# Patient Record
Sex: FEMALE | Race: BLACK OR AFRICAN AMERICAN | NOT HISPANIC OR LATINO | Employment: FULL TIME | URBAN - METROPOLITAN AREA
[De-identification: names, ages, dates, MRNs, and addresses within clinical notes are randomized per-mention and may not be internally consistent; named-entity substitution may affect disease eponyms.]

---

## 2023-07-27 ENCOUNTER — HOSPITAL ENCOUNTER (OUTPATIENT)
Dept: DATA CONVERSION | Facility: HOSPITAL | Age: 23
End: 2023-07-27
Attending: ANESTHESIOLOGY | Admitting: ANESTHESIOLOGY
Payer: COMMERCIAL

## 2023-07-27 DIAGNOSIS — G57.93 UNSPECIFIED MONONEUROPATHY OF BILATERAL LOWER LIMBS: ICD-10-CM

## 2023-07-27 DIAGNOSIS — M54.50 LOW BACK PAIN, UNSPECIFIED: ICD-10-CM

## 2023-07-27 DIAGNOSIS — Z45.49 ENCOUNTER FOR ADJUSTMENT AND MANAGEMENT OF OTHER IMPLANTED NERVOUS SYSTEM DEVICE: ICD-10-CM

## 2023-08-01 LAB
COMPLETE PATHOLOGY REPORT: NORMAL
CONVERTED CLINICAL DIAGNOSIS-HISTORY: NORMAL
CONVERTED FINAL DIAGNOSIS: NORMAL
CONVERTED FINAL REPORT PDF LINK TO COPY AND PASTE: NORMAL
CONVERTED GROSS DESCRIPTION: NORMAL

## 2023-09-29 VITALS — HEIGHT: 65 IN | BODY MASS INDEX: 30.89 KG/M2 | WEIGHT: 185.41 LBS

## 2023-09-30 NOTE — H&P
"    History & Physical Reviewed:   Pregnant/Lactating:  ·  Are You Pregnant no (1)   ·  Are You Currently Breastfeeding no (1)     I have reviewed the History and Physical dated:  10-Jul-2023   History and Physical reviewed and relevant findings noted. Patient examined to review pertinent physical  findings.: No significant changes   Home Medications Reviewed: no changes noted   Allergies Reviewed: no changes noted       ERAS (Enhanced Recovery After Surgery):  ·  ERAS Patient: no     Consent:   COVID-19 Consent:  ·  COVID-19 Risk Consent Surgeon has reviewed key risks related to the risk of artemio COVID-19 and if they contract COVID-19 what the risks are.     Attestation:   Note Completion:  I am a:  Resident/Fellow   Attending Attestation I saw and evaluated the patient.  I personally obtained the key and critical portions of the history and physical exam or was physically present for key and  critical portions performed by the resident/fellow. I reviewed the resident/fellow?s documentation and discussed the patient with the resident/fellow.  I agree with the resident/fellow?s medical decision making as documented in the note.   I personally evaluated the patient on 27-Jul-2023         Electronic Signatures:  Marcia Reid)  (Signed 27-Jul-2023 12:55)   Co-Signer: History & Physical Reviewed, ERAS, Consent,  Note Completion  Sonny Stewart (Fellow))  (Signed 27-Jul-2023 06:54)   Authored: History & Physical Reviewed, ERAS, Consent,  Note Completion      Last Updated: 27-Jul-2023 12:55 by Marcia Reid (MD)    References:  1.  Data Referenced From \"Patient Profile - Preop v3\" 27-Jul-2023 06:18   "

## 2023-10-02 NOTE — OP NOTE
Post Operative Note:     PreOp Diagnosis: Chronic lower extremity neuropathic  pain, now resolved   Post-Procedure Diagnosis: Chronic lower extremity  neuropathic pain, now resolved   Procedure: 1.  Explant dual percutaneous spinal cord  stimulator leads  2.  Explant internal programmable generator of the spinal cord stimulator  3.  Physician guided fluoroscopy   Surgeon: Marcia Reid MD, PhD   Resident/Fellow/Other Assistant: Sonny Stewart MD   Estimated Blood Loss (mL): none   Specimen: no   Findings: See Operative Note     Operative Report Dictated:  Dictation: not applicable - note contains Operative  Report   Operative Report:    Ms. Odom is a 22-year-old lady who has had chronic bilateral lower extremity pain since the age of 6 years old and ultimately received a spinal cord stimulator  at the age of 14 which helped relieve her pain.  However, over the past 2 to 3 years, her pain has been status he resolved and she has not been using the spinal cord stimulator and desires it to be explanted.  She is presenting today for an explant of  her spinal cord stimulator system.    Following informed consent, the patient was brought to the operating room and placed in the prone position.  The low back area was prepped and draped in usual sterile fashion.  The skin and subcutaneous tissue overlying the right paraspinous incision  in the lumbar region was anesthetized with a total of 15 cc of a solution containing 1% lidocaine and 0.25% bupivacaine with 1: 400,000 epinephrine.  A #15 blade was used to incise the skin and subcutaneous tissue down to the capsule of the spinal cord  stimulator generator.  The generator was then freed of its anchoring sutures and removed from the pocket.  The 4-lead contacts getting into the generator were cut with a suture scissor.  The leads were then dissected down to the anchors and the anchors  freed of their anchoring sutures.  Both 16-contact leads were removed in their  entirety.  The wound was then copiously irrigated with a saline solution and closed in a layered fashion with 0 Vicryl for the deepest layer followed by 2-0 V-Loc and 4-0 V-Loc  for the subcutaneous and subcuticular layers respectively.  Topical antibiotic and island dressing was then applied and the patient was then transferred to the recovery area in a stable condition.    She was given appropriate discharge instructions as well as a prescription of 9 tablets of oxycodone, 5 mg, 1 p.o. 3 times daily as needed.  She will follow-up with us in 1 week in the clinic.    Attestation:   Note Completion:  Attending Attestation I was present for the entire procedure         Electronic Signatures:  Marcia Reid)  (Signed 27-Jul-2023 13:03)   Authored: Post Operative Note, Note Completion      Last Updated: 27-Jul-2023 13:03 by Marcia Ried)

## 2023-10-30 ENCOUNTER — HOSPITAL ENCOUNTER (EMERGENCY)
Facility: HOSPITAL | Age: 23
Discharge: LEFT BEFORE DISCHARGE | End: 2023-10-30
Payer: COMMERCIAL

## 2023-10-30 VITALS
TEMPERATURE: 98.4 F | DIASTOLIC BLOOD PRESSURE: 76 MMHG | HEIGHT: 65 IN | RESPIRATION RATE: 16 BRPM | WEIGHT: 180 LBS | OXYGEN SATURATION: 94 % | SYSTOLIC BLOOD PRESSURE: 121 MMHG | BODY MASS INDEX: 29.99 KG/M2 | HEART RATE: 81 BPM

## 2023-10-30 PROCEDURE — 99281 EMR DPT VST MAYX REQ PHY/QHP: CPT

## 2023-10-30 PROCEDURE — 4500999001 HC ED NO CHARGE

## 2023-10-30 ASSESSMENT — COLUMBIA-SUICIDE SEVERITY RATING SCALE - C-SSRS
6. HAVE YOU EVER DONE ANYTHING, STARTED TO DO ANYTHING, OR PREPARED TO DO ANYTHING TO END YOUR LIFE?: NO
2. HAVE YOU ACTUALLY HAD ANY THOUGHTS OF KILLING YOURSELF?: NO
1. IN THE PAST MONTH, HAVE YOU WISHED YOU WERE DEAD OR WISHED YOU COULD GO TO SLEEP AND NOT WAKE UP?: NO

## 2023-10-30 NOTE — ED TRIAGE NOTES
Pt c/o sore throat with painful swallowing starting this AM. Pt denies any SOB, fever, chills or recent sick contacts. Pt A&Ox4 upon arrival, appears in no acute distress.

## 2023-10-31 ENCOUNTER — HOSPITAL ENCOUNTER (EMERGENCY)
Facility: HOSPITAL | Age: 23
Discharge: HOME | End: 2023-10-31
Payer: COMMERCIAL

## 2023-10-31 VITALS
TEMPERATURE: 98.8 F | HEART RATE: 103 BPM | DIASTOLIC BLOOD PRESSURE: 77 MMHG | SYSTOLIC BLOOD PRESSURE: 126 MMHG | RESPIRATION RATE: 18 BRPM | OXYGEN SATURATION: 100 %

## 2023-10-31 DIAGNOSIS — J04.0 LARYNGITIS: ICD-10-CM

## 2023-10-31 DIAGNOSIS — J02.9 VIRAL PHARYNGITIS: Primary | ICD-10-CM

## 2023-10-31 PROBLEM — N63.10 BREAST MASS, RIGHT: Status: ACTIVE | Noted: 2023-10-31

## 2023-10-31 PROBLEM — Z96.89 SPINAL CORD STIMULATOR STATUS: Status: ACTIVE | Noted: 2023-10-31

## 2023-10-31 PROBLEM — E55.9 VITAMIN D DEFICIENCY: Status: ACTIVE | Noted: 2023-10-31

## 2023-10-31 PROBLEM — H52.13 MYOPIA, BILATERAL: Status: ACTIVE | Noted: 2023-10-31

## 2023-10-31 PROBLEM — G89.29 CHRONIC BILATERAL LOW BACK PAIN WITHOUT SCIATICA: Status: ACTIVE | Noted: 2023-10-31

## 2023-10-31 PROBLEM — M54.50 CHRONIC BILATERAL LOW BACK PAIN WITHOUT SCIATICA: Status: ACTIVE | Noted: 2023-10-31

## 2023-10-31 PROBLEM — N93.9 ABNORMAL UTERINE BLEEDING: Status: ACTIVE | Noted: 2023-10-31

## 2023-10-31 PROBLEM — G90.529 CRPS (COMPLEX REGIONAL PAIN SYNDROME), LOWER LIMB: Status: ACTIVE | Noted: 2023-10-31

## 2023-10-31 PROBLEM — H52.203 ASTIGMATISM OF BOTH EYES: Status: ACTIVE | Noted: 2023-10-31

## 2023-10-31 PROBLEM — G47.00 INSOMNIA: Status: ACTIVE | Noted: 2023-10-31

## 2023-10-31 PROBLEM — M79.7 FIBROMYALGIA: Status: ACTIVE | Noted: 2023-10-31

## 2023-10-31 LAB
FLUAV RNA RESP QL NAA+PROBE: NOT DETECTED
FLUBV RNA RESP QL NAA+PROBE: NOT DETECTED
S PYO DNA THROAT QL NAA+PROBE: NOT DETECTED
SARS-COV-2 RNA RESP QL NAA+PROBE: NOT DETECTED

## 2023-10-31 PROCEDURE — 99283 EMERGENCY DEPT VISIT LOW MDM: CPT

## 2023-10-31 PROCEDURE — 2500000001 HC RX 250 WO HCPCS SELF ADMINISTERED DRUGS (ALT 637 FOR MEDICARE OP)

## 2023-10-31 PROCEDURE — 87636 SARSCOV2 & INF A&B AMP PRB: CPT

## 2023-10-31 PROCEDURE — 99284 EMERGENCY DEPT VISIT MOD MDM: CPT

## 2023-10-31 PROCEDURE — 87651 STREP A DNA AMP PROBE: CPT

## 2023-10-31 PROCEDURE — 2500000004 HC RX 250 GENERAL PHARMACY W/ HCPCS (ALT 636 FOR OP/ED)

## 2023-10-31 RX ORDER — PREDNISONE 20 MG/1
40 TABLET ORAL DAILY
Qty: 6 TABLET | Refills: 0 | Status: SHIPPED | OUTPATIENT
Start: 2023-10-31 | End: 2023-11-03

## 2023-10-31 RX ORDER — IBUPROFEN 600 MG/1
600 TABLET ORAL EVERY 6 HOURS PRN
Qty: 16 TABLET | Refills: 0 | Status: SHIPPED | OUTPATIENT
Start: 2023-10-31 | End: 2023-11-04

## 2023-10-31 RX ORDER — IBUPROFEN 600 MG/1
600 TABLET ORAL ONCE
Status: COMPLETED | OUTPATIENT
Start: 2023-10-31 | End: 2023-10-31

## 2023-10-31 RX ORDER — ACETAMINOPHEN 325 MG/1
650 TABLET ORAL EVERY 6 HOURS PRN
Qty: 20 TABLET | Refills: 0 | Status: SHIPPED | OUTPATIENT
Start: 2023-10-31 | End: 2023-11-05

## 2023-10-31 RX ADMIN — IBUPROFEN 600 MG: 600 TABLET, FILM COATED ORAL at 18:39

## 2023-10-31 RX ADMIN — DEXAMETHASONE 10 MG: 6 TABLET ORAL at 18:38

## 2023-10-31 ASSESSMENT — COLUMBIA-SUICIDE SEVERITY RATING SCALE - C-SSRS
2. HAVE YOU ACTUALLY HAD ANY THOUGHTS OF KILLING YOURSELF?: NO
1. IN THE PAST MONTH, HAVE YOU WISHED YOU WERE DEAD OR WISHED YOU COULD GO TO SLEEP AND NOT WAKE UP?: NO
6. HAVE YOU EVER DONE ANYTHING, STARTED TO DO ANYTHING, OR PREPARED TO DO ANYTHING TO END YOUR LIFE?: NO

## 2023-10-31 NOTE — ED PROVIDER NOTES
HPI   Chief Complaint   Patient presents with   • Sore Throat       22-year-old female with history of Fibromyalgia, CRPS, presents for chief complaint of sore throat.  Ongoing for a few days and worsening.  Denies injury.  Currently 7/10 pain.  Nonradiating.  Denies fevers, chills, myalgia.  Endorses some odynophagia.  Denies cough.  Denies congestion.  Endorses a runny nose and exposure to a sick son.  Denies chest pain or dyspnea.  Denies nausea or vomiting.  Denies any difficulty swallowing or breathing.                          No data recorded                Patient History   Past Medical History:   Diagnosis Date   • Chronic pain syndrome 10/20/2017    Pain syndrome, chronic   • Cutaneous abscess of other sites 08/01/2017    Cutaneous abscess of other site   • Diseases of the nervous system complicating pregnancy, unspecified trimester 07/01/2019    Multiple sclerosis affecting pregnancy   • Disruption of external operation (surgical) wound, not elsewhere classified, initial encounter 08/03/2017    Dehiscence of closure of skin   • Gestational (pregnancy-induced) hypertension without significant proteinuria, third trimester 06/16/2019    Gestational hypertension, third trimester   • Mild to moderate pre-eclampsia, third trimester 07/01/2019    Mild pre-eclampsia in third trimester   • Myopia, bilateral 10/09/2014    Bilateral myopia   • Personal history of other complications of pregnancy, childbirth and the puerperium 06/20/2019    History of severe pre-eclampsia   • Personal history of other diseases of the circulatory system 07/11/2019    History of postpartum hypertension   • Supervision of other high risk pregnancies, unspecified trimester 07/01/2019    Rubella non-immune status, antepartum   • Unspecified astigmatism, bilateral 10/09/2014    Astigmatism, bilateral     Past Surgical History:   Procedure Laterality Date   • OTHER SURGICAL HISTORY  12/17/2018    Spinal cord stimulation   • OTHER SURGICAL  HISTORY  2020     section     No family history on file.  Social History     Tobacco Use   • Smoking status: Unknown   • Smokeless tobacco: Not on file   Substance Use Topics   • Alcohol use: Not on file   • Drug use: Not on file       Physical Exam   ED Triage Vitals [10/31/23 1809]   Temp Heart Rate Resp BP   37.1 °C (98.8 °F) 103 18 126/77      SpO2 Temp src Heart Rate Source Patient Position   100 % -- -- --      BP Location FiO2 (%)     -- --       Physical Exam  Vitals and nursing note reviewed.   Constitutional:       General: She is not in acute distress.     Appearance: She is well-developed.   HENT:      Head: Normocephalic and atraumatic.      Mouth/Throat:      Palate: No mass and lesions.      Pharynx: Uvula midline. Posterior oropharyngeal erythema present.      Tonsils: No tonsillar exudate or tonsillar abscesses. 0 on the right. 0 on the left.   Eyes:      Conjunctiva/sclera: Conjunctivae normal.   Cardiovascular:      Rate and Rhythm: Normal rate and regular rhythm.      Heart sounds: No murmur heard.  Pulmonary:      Effort: Pulmonary effort is normal. No respiratory distress.      Breath sounds: Normal breath sounds.   Abdominal:      Palpations: Abdomen is soft.      Tenderness: There is no abdominal tenderness.   Musculoskeletal:         General: No swelling.      Cervical back: Neck supple.   Skin:     General: Skin is warm and dry.      Capillary Refill: Capillary refill takes less than 2 seconds.   Neurological:      Mental Status: She is alert.   Psychiatric:         Mood and Affect: Mood normal.         ED Course & MDM   Diagnoses as of 10/31/23 1836   Viral pharyngitis   Laryngitis       Medical Decision Making  Vital signs reviewed, remarkable for mild tachycardia at 103 bpm.  Otherwise unremarkable.  Patient is well-appearing and in no apparent distress.  Speaks full sentences without difficulty.  Diagnostic testing performed.  COVID and influenza and strep swabs obtained.   Patient states that she can follow-up  [facility].  Given ibuprofen and prednisone for symptom management.  States that she has had laryngitis in the past and this feels similar, as her voice is scratchy.  States that they have given steroids in the past and it worked well.  Will be giving short course of steroids as well.  No antibiotics needed.  I do not believe that this is strep pharyngitis, although strep is being tested for to rule out.  Advised to take medications as prescribed and to return with any new or worsening symptoms.  Encouraged to follow-up with primary care soon as possible as well.  Patient in agreement with this plan.  Discharged in stable condition.        Procedure  Procedures     Misael Ngo, APRN-CNP  10/31/23 [id number]

## 2024-03-05 DIAGNOSIS — G90.529 COMPLEX REGIONAL PAIN SYNDROME I OF UNSPECIFIED LOWER LIMB: ICD-10-CM

## 2024-03-05 RX ORDER — AMITRIPTYLINE HYDROCHLORIDE 50 MG/1
50 TABLET, FILM COATED ORAL NIGHTLY
Qty: 30 TABLET | Refills: 7 | Status: SHIPPED | OUTPATIENT
Start: 2024-03-05

## 2024-03-14 DIAGNOSIS — G90.529 COMPLEX REGIONAL PAIN SYNDROME I OF UNSPECIFIED LOWER LIMB: ICD-10-CM

## 2024-03-15 RX ORDER — GABAPENTIN 600 MG/1
TABLET, FILM COATED ORAL
Qty: 90 TABLET | Refills: 6 | Status: SHIPPED | OUTPATIENT
Start: 2024-03-15

## 2024-10-29 DIAGNOSIS — G90.529 COMPLEX REGIONAL PAIN SYNDROME I OF UNSPECIFIED LOWER LIMB: ICD-10-CM

## 2024-10-30 RX ORDER — GABAPENTIN 600 MG/1
TABLET, FILM COATED ORAL
Qty: 90 TABLET | Refills: 6 | Status: SHIPPED | OUTPATIENT
Start: 2024-10-30

## 2024-11-08 DIAGNOSIS — G90.529 COMPLEX REGIONAL PAIN SYNDROME I OF UNSPECIFIED LOWER LIMB: ICD-10-CM

## 2024-11-08 RX ORDER — AMITRIPTYLINE HYDROCHLORIDE 50 MG/1
50 TABLET, FILM COATED ORAL NIGHTLY
Qty: 30 TABLET | Refills: 7 | Status: SHIPPED | OUTPATIENT
Start: 2024-11-08

## 2025-04-01 ENCOUNTER — OFFICE VISIT (OUTPATIENT)
Dept: OBSTETRICS AND GYNECOLOGY | Facility: CLINIC | Age: 25
End: 2025-04-01
Payer: COMMERCIAL

## 2025-04-01 VITALS
DIASTOLIC BLOOD PRESSURE: 82 MMHG | SYSTOLIC BLOOD PRESSURE: 110 MMHG | HEIGHT: 65 IN | BODY MASS INDEX: 32.32 KG/M2 | WEIGHT: 194 LBS

## 2025-04-01 DIAGNOSIS — Z97.5 IUD (INTRAUTERINE DEVICE) IN PLACE: ICD-10-CM

## 2025-04-01 DIAGNOSIS — N63.13 MASS OF LOWER OUTER QUADRANT OF RIGHT BREAST: ICD-10-CM

## 2025-04-01 DIAGNOSIS — Z01.419 WELL WOMAN EXAM WITH ROUTINE GYNECOLOGICAL EXAM: Primary | ICD-10-CM

## 2025-04-01 PROBLEM — R92.30 DENSE BREAST TISSUE: Status: ACTIVE | Noted: 2025-04-01

## 2025-04-01 PROCEDURE — 88175 CYTOPATH C/V AUTO FLUID REDO: CPT

## 2025-04-01 PROCEDURE — 3008F BODY MASS INDEX DOCD: CPT | Performed by: ADVANCED PRACTICE MIDWIFE

## 2025-04-01 PROCEDURE — 1036F TOBACCO NON-USER: CPT | Performed by: ADVANCED PRACTICE MIDWIFE

## 2025-04-01 PROCEDURE — 87591 N.GONORRHOEAE DNA AMP PROB: CPT

## 2025-04-01 PROCEDURE — 87491 CHLMYD TRACH DNA AMP PROBE: CPT

## 2025-04-01 PROCEDURE — 99385 PREV VISIT NEW AGE 18-39: CPT | Performed by: ADVANCED PRACTICE MIDWIFE

## 2025-04-01 PROCEDURE — 87661 TRICHOMONAS VAGINALIS AMPLIF: CPT

## 2025-04-01 ASSESSMENT — ENCOUNTER SYMPTOMS
CONSTITUTIONAL NEGATIVE: 0
CARDIOVASCULAR NEGATIVE: 0
RESPIRATORY NEGATIVE: 0
GASTROINTESTINAL NEGATIVE: 0
ALLERGIC/IMMUNOLOGIC NEGATIVE: 0
NEUROLOGICAL NEGATIVE: 0
ENDOCRINE NEGATIVE: 0
HEMATOLOGIC/LYMPHATIC NEGATIVE: 0
MUSCULOSKELETAL NEGATIVE: 0
EYES NEGATIVE: 0
PSYCHIATRIC NEGATIVE: 0

## 2025-04-01 NOTE — PROGRESS NOTES
"Subjective   Inocente Odom is a 24 y.o. female who is here for Annual Exam (Last PAP= due today  //Fide. TAYLOR MA II/).     Concerns today:      Periods are rare, lasting 0 days.   Dysmenorrhea:none.   Cyclic symptoms include none.      Sexual Activity: sexually active, male partners; Patient reports 1 partners in the last 12 months.  Pain with intercourse? No   Loss of desire? Yes  Able to have an orgasm? No - has never- has not really had much deisres for sex was celibate for 4 yrs prior     History of prior STI: none  Desires STI screening? Yes    Current contraception: IUD    Last pap: 2020  History of abnormal Pap smear: no  Family history of uterine or ovarian cancer: no    Last mammogram: none  History of abnormal mammogram: no  Family history of breast cancer: yes - maternal aunt - diagnosed 40s   OB History   No obstetric history on file.      Objective   /82   Ht 1.651 m (5' 5\")   Wt 88 kg (194 lb)    Physical Exam  Vitals reviewed.   Constitutional:       General: She is not in acute distress.     Appearance: Normal appearance.   HENT:      Head: Normocephalic.   Eyes:      Pupils: Pupils are equal, round, and reactive to light.   Cardiovascular:      Rate and Rhythm: Normal rate and regular rhythm.      Heart sounds: No murmur heard.     No gallop.   Pulmonary:      Effort: Pulmonary effort is normal. No respiratory distress.      Breath sounds: Normal breath sounds. No stridor. No wheezing, rhonchi or rales.   Chest:      Chest wall: No tenderness or crepitus.   Breasts:     Right: No inverted nipple, mass, nipple discharge, skin change or tenderness.      Left: Normal. No inverted nipple, mass, nipple discharge, skin change or tenderness.   Abdominal:      General: Abdomen is flat.      Palpations: Abdomen is soft. There is no mass.      Tenderness: There is no abdominal tenderness. There is no right CVA tenderness, left CVA tenderness or rebound.      Hernia: No hernia is present. "   Genitourinary:     General: Normal vulva.      Pubic Area: No rash.       Labia:         Right: No rash, tenderness, lesion or injury.         Left: No rash, tenderness, lesion or injury.       Urethra: No prolapse or urethral swelling.      Vagina: Normal. No foreign body. No erythema, tenderness, bleeding, lesions or prolapsed vaginal walls.      Cervix: No cervical motion tenderness, friability or lesion.      Uterus: Normal. Not enlarged, not tender and no uterine prolapse.       Adnexa: Right adnexa normal and left adnexa normal.        Right: No mass or tenderness.          Left: No mass or tenderness.        Rectum: Normal.      Comments: EGBUS WNL   Musculoskeletal:      Cervical back: Neck supple. No rigidity or tenderness.   Skin:     General: Skin is warm and dry.   Neurological:      Mental Status: She is alert.   Psychiatric:         Mood and Affect: Mood normal.         Behavior: Behavior normal.         Thought Content: Thought content normal.         Judgment: Judgment normal.          Assessment/Plan   Problem List Items Addressed This Visit             ICD-10-CM    IUD (intrauterine device) in place Z97.5    Breast mass, right N63.10     Other Visit Diagnoses         Codes    Well woman exam with routine gynecological exam    -  Primary Z01.419    Relevant Orders    THINPREP PAP TEST          No follow-ups on file.  Veronica Thornton, JIMENEZ-ANGLE

## 2025-04-03 LAB
C TRACH RRNA SPEC QL NAA+PROBE: NEGATIVE
N GONORRHOEA DNA SPEC QL PROBE+SIG AMP: NEGATIVE
T VAGINALIS RRNA SPEC QL NAA+PROBE: NEGATIVE

## 2025-04-16 LAB
CYTOLOGY CMNT CVX/VAG CYTO-IMP: NORMAL
LAB AP HPV GENOTYPE QUESTION: YES
LAB AP HPV HR: NORMAL
LAB AP PAP ADDITIONAL TESTS: NORMAL
LABORATORY COMMENT REPORT: NORMAL
PATH REPORT.TOTAL CANCER: NORMAL

## 2025-06-05 ENCOUNTER — APPOINTMENT (OUTPATIENT)
Dept: OBSTETRICS AND GYNECOLOGY | Facility: CLINIC | Age: 25
End: 2025-06-05
Payer: COMMERCIAL

## 2025-06-13 DIAGNOSIS — G90.529 COMPLEX REGIONAL PAIN SYNDROME I OF UNSPECIFIED LOWER LIMB: ICD-10-CM

## 2025-06-13 RX ORDER — GABAPENTIN 600 MG/1
TABLET, FILM COATED ORAL
Qty: 90 TABLET | Refills: 6 | Status: SHIPPED | OUTPATIENT
Start: 2025-06-13

## 2025-07-11 DIAGNOSIS — G90.529 COMPLEX REGIONAL PAIN SYNDROME I OF UNSPECIFIED LOWER LIMB: ICD-10-CM

## 2025-07-11 RX ORDER — AMITRIPTYLINE HYDROCHLORIDE 50 MG/1
50 TABLET, FILM COATED ORAL NIGHTLY
Qty: 30 TABLET | Refills: 7 | Status: SHIPPED | OUTPATIENT
Start: 2025-07-11